# Patient Record
Sex: FEMALE | Race: BLACK OR AFRICAN AMERICAN
[De-identification: names, ages, dates, MRNs, and addresses within clinical notes are randomized per-mention and may not be internally consistent; named-entity substitution may affect disease eponyms.]

---

## 2024-03-25 ENCOUNTER — NON-APPOINTMENT (OUTPATIENT)
Age: 44
End: 2024-03-25

## 2024-03-25 PROBLEM — Z00.00 ENCOUNTER FOR PREVENTIVE HEALTH EXAMINATION: Status: ACTIVE | Noted: 2024-03-25

## 2024-03-27 ENCOUNTER — NON-APPOINTMENT (OUTPATIENT)
Age: 44
End: 2024-03-27

## 2024-03-29 ENCOUNTER — APPOINTMENT (OUTPATIENT)
Dept: BREAST CENTER | Facility: CLINIC | Age: 44
End: 2024-03-29
Payer: COMMERCIAL

## 2024-03-29 DIAGNOSIS — Z78.9 OTHER SPECIFIED HEALTH STATUS: ICD-10-CM

## 2024-03-29 DIAGNOSIS — Z86.79 PERSONAL HISTORY OF OTHER DISEASES OF THE CIRCULATORY SYSTEM: ICD-10-CM

## 2024-03-29 PROCEDURE — 99203 OFFICE O/P NEW LOW 30 MIN: CPT

## 2024-03-29 RX ORDER — ETONOGESTREL AND ETHINYL ESTRADIOL .12; .015 MG/D; MG/D
INSERT, EXTENDED RELEASE VAGINAL
Refills: 0 | Status: ACTIVE | COMMUNITY

## 2024-03-29 NOTE — ASSESSMENT
[FreeTextEntry1] : 43-year-old female, abnormal breast imaging  Reviewed the results with the patient of her bilateral screening mammogram and breast US completed on 3/13/2024, reviewed there were no abnormal mammographic findings, discussed the results of the nonspecific left axillary lymph node with mild cortical thickening of 0.4 cm, discussed with the patient the clinical breast exam findings which were unremarkable, no palpable masses and no adenopathy were appreciated on physical exam bilaterally.  Discussed with the patient that this finding may be reactive in nature, discussed the recommendation to proceed with a 6-month follow-up targeted left breast US.  Discussed the option given patient's concern the option to proceed with a 3-month follow-up targeted left breast US.  The patient would for to proceed with a targeted left breast US in 3 months and follow-up at that time.  Reviewed with the patient although the nonspecific finding in the left axilla is favored to be related to reactive changes discussed recommendation to meet with a hematologist should additional clinical evaluation be warranted given the nonspecific finding in the left axilla.  The patient will be referred to hematologist for further evaluation of this.  Discussed importance and implication of genetic testing in regards to their family history of reported possible to family members affected by ovarian cancer in patient's maternal aunt and maternal grandmother.  Recommended patient meet with a genetic counselor to discuss her family history and options to proceed with genetic testing.  The patient was amenable and will be referred.    Discussed breast awareness and self breast exams with the patient. Recommended simple pattern of palpation, while seated and while laying down. Recommended that she performs these breast exams at the same time every month, as to time it with her cycle. Discussed characteristics of a suspicious mass, such as irregular, hard like a rock and fixed/immobile. Patient understands.

## 2024-03-29 NOTE — HISTORY OF PRESENT ILLNESS
[FreeTextEntry1] : JANELLE is a 43 year old female, referred by Dr. Rodríguez (OBGYN), who presents for initial evaluation regarding abnormal breast imaging.  The patient underwent a bilateral screening mammogram and breast US on 3/13/2024 results showed a nonspecific left axillary lymph node with mild cortical thickening measuring 0.4 cm, recommended for clinical evaluation and 6-month follow-up targeted left breast US.  The patient denies any trauma to the left arm, denies trauma infection or injury to the left axillary region.  The patient denies recent vaccination.  Patient denies any history of autoimmune disorders.  Patient has no breast complaints. Denies palpable abnormalities of the breast, no skin changes/dimpling, no nipple discharge bilaterally.   Denies history of breast biopsy or breast surgery.   JACOB lifetime risk of 10.5%; the patient denies any family history of breast cancer.  Patient states that her maternal aunt was possibly diagnosed with ovarian cancer in her 70s and DOD.  The patient has a maternal grandmother diagnosed with stomach cancer versus ovarian cancer and DOD at age 79 or 80.

## 2024-03-29 NOTE — PAST MEDICAL HISTORY
[Menstruating] : The patient is menstruating [Irregular Cycle Intervals] : are  irregular [Multiple Births ___] :  multiple birth pregnancies: [unfilled] [Menarche Age ____] : age at menarche was [unfilled] [Definite ___ (Date)] : the last menstrual period was [unfilled] [Total Preg ___] : G[unfilled] [Live Births ___] : P[unfilled]  [Age At Live Birth ___] : Age at live birth: [unfilled] [History of Hormone Replacement Treatment] : has no history of hormone replacement treatment

## 2024-03-29 NOTE — PLAN
[TextEntry] : Targeted left axillary US in June 2024 Follow-up June 2024 Consult with hematology Consult with genetic counselor

## 2024-03-29 NOTE — PHYSICAL EXAM
[Normocephalic] : normocephalic [No Supraclavicular Adenopathy] : no supraclavicular adenopathy [Examined in the supine and seated position] : examined in the supine and seated position [No dominant masses] : no dominant masses in right breast  [No dominant masses] : no dominant masses left breast [No Nipple Retraction] : no left nipple retraction [No Nipple Discharge] : no right nipple discharge [No Axillary Lymphadenopathy] : no left axillary lymphadenopathy [No Edema] : no edema [No Swelling] : no swelling [No Rashes] : no rashes [No Ulceration] : no ulceration

## 2024-03-29 NOTE — FAMILY HISTORY
[TextEntry] : Maternal aunt, possible ovarian cancer, 70s, DOD Maternal grandmother, stomach cancer versus ovarian cancer, DOD 79 or 80 Denies family history of breast cancer, pancreatic cancer, colon cancer, melanoma

## 2024-03-29 NOTE — DATA REVIEWED
[FreeTextEntry1] : 3/13/24 (R) b/l screening mammogram & US: heterogeneously dense, b/l axillary region skin folds. left axillary lymph node with mild cortical thickening at 0.4cm, stability cannot be confirmed. Non specific left axillary lymph node cortical thickening on US, clinical correlation is recommended. if clinical evaluation shows no clinical or lab findings concerning for lymphatic pathology, benign etiologies then including reactive changes are favored and 6 month follow up targeted left breast US is recommended. Probably benign BIRADS 3

## 2024-04-10 ENCOUNTER — NON-APPOINTMENT (OUTPATIENT)
Age: 44
End: 2024-04-10

## 2024-06-19 ENCOUNTER — NON-APPOINTMENT (OUTPATIENT)
Age: 44
End: 2024-06-19

## 2024-06-21 ENCOUNTER — APPOINTMENT (OUTPATIENT)
Dept: BREAST CENTER | Facility: CLINIC | Age: 44
End: 2024-06-21
Payer: COMMERCIAL

## 2024-06-21 VITALS
DIASTOLIC BLOOD PRESSURE: 95 MMHG | HEIGHT: 62 IN | BODY MASS INDEX: 35.51 KG/M2 | HEART RATE: 62 BPM | SYSTOLIC BLOOD PRESSURE: 147 MMHG | WEIGHT: 193 LBS

## 2024-06-21 DIAGNOSIS — Z80.41 FAMILY HISTORY OF MALIGNANT NEOPLASM OF OVARY: ICD-10-CM

## 2024-06-21 DIAGNOSIS — R92.8 OTHER ABNORMAL AND INCONCLUSIVE FINDINGS ON DIAGNOSTIC IMAGING OF BREAST: ICD-10-CM

## 2024-06-21 PROCEDURE — 99213 OFFICE O/P EST LOW 20 MIN: CPT

## 2024-06-21 NOTE — ASSESSMENT
[FreeTextEntry1] : 43-year-old female, abnormal breast imaging  Reviewed the results with the patient of her targeted left axillary ultrasound completed on 6/14/2024 showing there is no axillary lymphadenopathy with the left axillary lymph node of concern cortical thickness is less when compared to 3/13/2024, results were benign BI-RADS 2.  Advised the patient she can return to her annual screening imaging with a bilateral screening mammogram and ultrasound that will be due in March 2025.  Reviewed with the patient she can return to her PCP or gynecologist moving forward for breast cancer screening and clinical breast exams and follow-up as needed.   Reviewed importance and implication of genetic testing in regards to their family history of reported possible to family members affected by ovarian cancer in patient's maternal aunt and maternal grandmother.  Reviewed recommendation for patient to meet with a genetic counselor to discuss her family history and options to proceed with genetic testing.  The patient states that the genetics team had not reached out to her for a consult, referral will be resent regarding this patient.    JAVIER Myers

## 2024-06-21 NOTE — DATA REVIEWED
[FreeTextEntry1] : 3/13/24 ([facility]) b/l screening mammogram & US: heterogeneously dense, b/l axillary region skin folds. left axillary lymph node with mild cortical thickening at 0.4cm, stability cannot be confirmed. Non specific left axillary lymph node cortical thickening on US, clinical correlation is recommended. if clinical evaluation shows no clinical or lab findings concerning for lymphatic pathology, benign etiologies then including reactive changes are favored and 6 month follow up targeted left breast US is recommended. Probably benign BIRADS 3  6/14/2024 ([facility]) left breast targeted US: No left axillary lymphadenopathy. Benign findings. BIRADS 2

## 2024-06-21 NOTE — PAST MEDICAL HISTORY
[Menstruating] : The patient is menstruating [Menarche Age ____] : age at menarche was [unfilled] [Definite ___ (Date)] : the last menstrual period was [unfilled] [Irregular Cycle Intervals] : are  irregular [Total Preg ___] : G[unfilled] [Live Births ___] : P[unfilled]  [Multiple Births ___] :  multiple birth pregnancies: [unfilled] [Age At Live Birth ___] : Age at live birth: [unfilled] [History of Hormone Replacement Treatment] : has no history of hormone replacement treatment

## 2024-06-21 NOTE — PHYSICAL EXAM
[Normocephalic] : normocephalic [No Supraclavicular Adenopathy] : no supraclavicular adenopathy [Examined in the supine and seated position] : examined in the supine and seated position [No dominant masses] : no dominant masses in right breast  [No dominant masses] : no dominant masses left breast [No Nipple Retraction] : no left nipple retraction [No Nipple Discharge] : no left nipple discharge [No Axillary Lymphadenopathy] : no left axillary lymphadenopathy [No Edema] : no edema [No Swelling] : no swelling [No Rashes] : no rashes [No Ulceration] : no ulceration

## 2024-06-21 NOTE — HISTORY OF PRESENT ILLNESS
[FreeTextEntry1] : JANELLE is a 43 year old female, for follow up of abnormal breast imaging.  The patient underwent a bilateral screening mammogram and breast US on 3/13/2024 results showed a nonspecific left axillary lymph node with mild cortical thickening measuring 0.4 cm, recommended for clinical evaluation and 6-month follow-up targeted left breast US.  The patient denies any trauma to the left arm, denies trauma infection or injury to the left axillary region.  The patient denies recent vaccination.  Patient denies any history of autoimmune disorders.  Patient has no breast complaints. Denies palpable abnormalities of the breast, no skin changes/dimpling, no nipple discharge bilaterally. The patient had opted to undergo a short interval 3-month follow-up targeted left axillary ultrasound, which she completed on 6/14/2024 and returned benign BI-RADS 2, no axillary lymphadenopathy was appreciated on Ultrasound.  Denies history of breast biopsy or breast surgery.   JACOB lifetime risk of 10.5%; the patient denies any family history of breast cancer.  Patient states that her maternal aunt was possibly diagnosed with ovarian cancer in her 70s and DOD.  The patient has a maternal grandmother diagnosed with stomach cancer versus ovarian cancer and DOD at age 79 or 80.

## 2024-07-31 ENCOUNTER — APPOINTMENT (OUTPATIENT)
Dept: HEMATOLOGY ONCOLOGY | Facility: CLINIC | Age: 44
End: 2024-07-31

## 2024-08-06 NOTE — DISCUSSION/SUMMARY
[FreeTextEntry1] : REASON FOR CONSULT: Svitlana Valentine is a 43-year-old female referred by nurse practitioner, Jazmine Harvey, for cancer genetic counseling and risk assessment due to a family history of cancer. Ms. Valentine was seen on 2024 at which time medical and family history was ascertained and a pedigree constructed.   RELEVANT MEDICAL HISTORY: Ms. Valentine is a healthy individual with no reported history of cancer. She has a family history of cancer, see below.  OTHER MEDICAL AND SURGICAL HISTORY: -	Medical History: HTN -	Surgical History: 2 hernia repairs, myomectomy, cholecystectomy,  x 2   OB/GYN HISTORY: H/W: 5'2", 200lb Obstetrical History:  Age at Menarche: 16 Menopausal Status: Premenopausal  Age at First Live Birth: 21 Oral Contraceptive Use: Yes, 20 years Hormone Replacement Therapy: No  CANCER SCREENING HISTORY:   Breast:  -	3/13/24 (R) b/l screening mammogram & US: heterogeneously dense, b/l axillary region skin folds. left axillary lymph node with mild cortical thickening at 0.4cm, stability cannot be confirmed. Non specific left axillary lymph node cortical thickening on US, clinical correlation is recommended. if clinical evaluation shows no clinical or lab findings concerning for lymphatic pathology, benign etiologies then including reactive changes are favored and 6 month follow up targeted left breast US is recommended. Probably benign BIRADS 3 -	2024 (R) left breast targeted US: No left axillary lymphadenopathy. Benign findings. BIRADS 2. -	Biopsies: No GYN: -	Pelvic Examination: Annual- reportedly wnl, history of fibroids Colon: -	Colonoscopy: 1-2 years ago- colitis Skin:   -	FBSE: No -	Lesions biopsied/removed: No  SOCIAL HISTORY: -	Tobacco-product use: No -	Environmental exposures: No   FAMILY HISTORY: Maternal ancestry was reported as [place] and paternal ancestry was reported as [place]. Ashkenazi Congregation ancestry was denied. A detailed family history of cancer was ascertained, see below and scanned chart for pedigree.   According to Ms. Valentine no one in the family has had germline testing for cancer susceptibility. Consanguinity was denied.  	 RISK ASSESSMENT: Ms. Valentine's personal and family history is slightly suggestive of a hereditary cancer syndrome however she is unsure if her maternal aunt and maternal grandmother had either stomach or ovarian cancer. Although risk assessment is difficult in the setting of unknown primaries, we discussed genetic testing may be beneficial to determine appropriate management. Patient is aware of potential out of pocket cost if insurance denies testing. We therefore recommended genetic testing for genes associated with breast, gynecological, and gastric cancers through [facility]. This test analyzes 30 genes: APC, ABDULKADIR, BARD1, BMPR1A, BRCA1, BRCA2, BRIP1, CDH1, CHEK2, CTNNA1, EPCAM, FH, KIT, MLH1, MSH2, MSH6, NF1, PALB2, PDGFRA, PMS2, PTEN, RAD51C, RAD51D, SDHA, SDHB, SDHC, SDHD, SMAD4, STK11, and TP53.  The risks, benefits and limitations of genetic testing were discussed with Ms. Valentine. In addition, we discussed the purpose of genetic testing and possible test results (positive, negative, inconclusive) along with associated medical management options and psychosocial implications. Insurance coverage and potential out of pocket costs were also discussed. The Genetic Information Non-discrimination Act (JESUS) was reviewed.  It was explained that risk assessment is based upon medical and family history as provided and may change in the future should new information be obtained.   Following our discussion, Ms. Valentine consented to the above-mentioned genetic testing panel. Blood was drawn in our laboratory and sent to [facility] today.  PLAN:  1.	Blood drawn today will be sent to [facility] for analysis.  2.	We will contact Ms. Valentine to schedule a follow-up appointment once the results are available. Results generally return in 2-3 weeks.   For any additional questions please call Cancer Genetics at [phone].    Rola Pelletier MS, [facility] Genetic Counselor, Cancer Genetics

## 2024-08-27 ENCOUNTER — NON-APPOINTMENT (OUTPATIENT)
Age: 44
End: 2024-08-27

## 2024-08-27 NOTE — DISCUSSION/SUMMARY
[FreeTextEntry1] : RESULTS TRANSMISSION:   Svitlana Valentine is a 43-year-old female who was contacted August 27, 2024 for a discussion regarding her variant of uncertain significance (VUS) genetic testing results related to hereditary cancer predisposition. This session was conducted via telephone.    Ms. Valentine was originally seen at [facility] on July 31, 2024 for hereditary cancer predisposition risk assessment due to a family history of cancer. At that time, Ms. Valentine decided to pursue genetic testing for genes associated with breast, gynecological, and gastric cancers through [facility].  TEST RESULTS: VARIANT OF UNCERTAIN SIGNIFICANCE (VUS)- APC (c.[id number]; [id number])  NO pathogenic (disease-causing) variants or additional VUSs were detected in the following genes [30]:  APC, ABDULKADIR, BARD1, BMPR1A, BRCA1, BRCA2, BRIP1, CDH1, CHEK2, CTNNA1, EPCAM, FH, KIT, MLH1, MSH2, MSH6, NF1, PALB2, PDGFRA, PMS2, PTEN, RAD51C, RAD51D, SDHA, SDHB, SDHC, SDHD, SMAD4, STK11, and TP53.  RESULTS INTERPRETATION AND ASSESSMENT: At this time the available evidence is insufficient to determine the role of this VUS in disease and the clinical significance of this result is uncertain. Pathogenic mutations in the APC gene are associated with Familial Adenomatous Polyposis syndrome, Attenuated Familial Adenomatous Polyposis syndrome, and Gastric adenocarcinoma and proximal polyposis of the stomach. It is unknown if the patient has an increased risk for the cancers associated with the APC gene at this time but we did discuss it is reassuring the patient had a colonoscopy and reported no history of polyps.   The detection of this VUS does NOT currently change the patient's medical management. It is NOT recommended at this time that family members use this result for predictive genetic testing or medical management decisions. With more research, a VUS may be reclassified as either disease-causing or benign. Ms. Valentine was encouraged to contact us every 2-3 years to enquire about any new information for this variant, or sooner if there are any changes in her personal or family history of cancer.  Such updates could possibly change our risk assessment and recommendations.  We also discussed that, while no clear cause of the patient's personal and family history of cancer was identified, this result, while reassuring, does entirely not rule out a hereditary cancer risk in the patient. It is possible, although unlikely, the patient has a mutation in one of the genes tested that is not detectable by this analysis, or has a mutation in a different gene, either known or unknown. It is also possible there is a hereditary cancer predisposition in the family, but the patient did not inherit it.   Given Ms. Valentine's personal and current reported family history of cancer, and her negative genetic test results, the following screening guidelines and risk-reducing recommendations were discussed:  OTHER: -	In the absence of other indications, Ms. Valentine should practice age-appropriate cancer screening of other organ systems as recommended for the general population.  PLAN: 1.	These results do not change Ms. Valentine's medical management.  2.	Testing of family members for this VUS is not recommended.  3.	Patient informed consult note(s) will be available through their [employer] patient portal and genetic test results will be released via [facility]s laboratory portal.  4.	The patient was encouraged to contact us every 2-3 years to check on any changes in interpretation of the VUS, or sooner if there are changes in her personal or family history of cancer.   For any additional questions please call Cancer Genetics at [phone].    Rola Pelletier MS, [facility] Counselor, [facility]

## 2024-08-30 ENCOUNTER — APPOINTMENT (OUTPATIENT)
Dept: NEPHROLOGY | Facility: CLINIC | Age: 44
End: 2024-08-30
Payer: COMMERCIAL

## 2024-08-30 VITALS — HEART RATE: 66 BPM | SYSTOLIC BLOOD PRESSURE: 148 MMHG | DIASTOLIC BLOOD PRESSURE: 97 MMHG

## 2024-08-30 VITALS — BODY MASS INDEX: 36.95 KG/M2 | WEIGHT: 202 LBS

## 2024-08-30 DIAGNOSIS — R00.2 PALPITATIONS: ICD-10-CM

## 2024-08-30 DIAGNOSIS — Z79.899 OTHER LONG TERM (CURRENT) DRUG THERAPY: ICD-10-CM

## 2024-08-30 DIAGNOSIS — E88.810 METABOLIC SYNDROME: ICD-10-CM

## 2024-08-30 DIAGNOSIS — I10 ESSENTIAL (PRIMARY) HYPERTENSION: ICD-10-CM

## 2024-08-30 DIAGNOSIS — H81.10 BENIGN PAROXYSMAL VERTIGO, UNSPECIFIED EAR: ICD-10-CM

## 2024-08-30 DIAGNOSIS — R79.9 ABNORMAL FINDING OF BLOOD CHEMISTRY, UNSPECIFIED: ICD-10-CM

## 2024-08-30 DIAGNOSIS — E66.9 OBESITY, UNSPECIFIED: ICD-10-CM

## 2024-08-30 DIAGNOSIS — R68.89 OTHER GENERAL SYMPTOMS AND SIGNS: ICD-10-CM

## 2024-08-30 PROCEDURE — 36415 COLL VENOUS BLD VENIPUNCTURE: CPT

## 2024-08-30 PROCEDURE — 99204 OFFICE O/P NEW MOD 45 MIN: CPT | Mod: 25

## 2024-08-30 RX ORDER — CHLORTHALIDONE 25 MG/1
25 TABLET ORAL
Qty: 45 | Refills: 3 | Status: ACTIVE | COMMUNITY
Start: 2024-08-30 | End: 1900-01-01

## 2024-08-30 NOTE — PHYSICAL EXAM
[General Appearance - Alert] : alert [General Appearance - In No Acute Distress] : in no acute distress [Neck Appearance] : the appearance of the neck was normal [Neck Cervical Mass (___cm)] : no neck mass was observed [Jugular Venous Distention Increased] : there was no jugular-venous distention [Thyroid Nodule] : there were no palpable thyroid nodules [Thyroid Diffuse Enlargement] : the thyroid was not enlarged [Auscultation Breath Sounds / Voice Sounds] : lungs were clear to auscultation bilaterally [Heart Rate And Rhythm] : heart rate was normal and rhythm regular [Heart Sounds] : normal S1 and S2 [Heart Sounds Gallop] : no gallops [Murmurs] : no murmurs [Heart Sounds Pericardial Friction Rub] : no pericardial rub [Edema] : there was no peripheral edema [Abdomen Soft] : soft [Abdomen Tenderness] : non-tender [] : no hepato-splenomegaly [Abdomen Mass (___ Cm)] : no abdominal mass palpated [Cervical Lymph Nodes Enlarged Posterior Bilaterally] : posterior cervical [Cervical Lymph Nodes Enlarged Anterior Bilaterally] : anterior cervical [Supraclavicular Lymph Nodes Enlarged Bilaterally] : supraclavicular [FreeTextEntry1] : pitting ankle edema

## 2024-08-30 NOTE — ASSESSMENT
[FreeTextEntry1] : # Uncontrolled HTN. * Recheck labs, including renin/yifan. * Sleep apnea eval. * Change HCTZ 12.5 mg to chlorthalidone 12.5 mg daily.  * The patient's blood pressure was checked with the [employer] [id number]-[id number] using the SPRINT trial protocol after sitting quietly in an empty room with arm supported, back supported, and feet on the floor for 5 minutes. The average of 3 readings were taken. * A counseling information sheet on blood pressure and staying healthy has been given (which they have been instructed to read). * The patient has been counseled to check their BP at home with an automatic arm cuff, write down the readings, and reach me directly on the phone immediately if they are persistently > 180 systolic or if SBP is less than 100 or if lightheadedness develops. They were counseled to bring in all blood pressure readings and medications next visit. * The patient has been counseled that regular office follow-up (at least every 1 mo for now)  is important for monitoring and for their health, and that it is their responsibility to make follow up appointments. * The patient also has been counseled that they must never stop or change any medications without discussing this with me (or another physician).   # Obesity. * Weight loss. * Endo referrral.  # History of palpitations. * Cardiology referral.
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
## 2024-09-03 LAB
ALBUMIN SERPL ELPH-MCNC: 3.9 G/DL
ALDOSTERONE SERUM: 26.6 NG/DL
ALP BLD-CCNC: 71 U/L
ALT SERPL-CCNC: 7 U/L
ANION GAP SERPL CALC-SCNC: 12 MMOL/L
APPEARANCE: CLEAR
AST SERPL-CCNC: 17 U/L
BASOPHILS # BLD AUTO: 0.05 K/UL
BASOPHILS NFR BLD AUTO: 0.9 %
BILIRUB SERPL-MCNC: 0.5 MG/DL
BILIRUBIN URINE: NEGATIVE
BLOOD URINE: NEGATIVE
BUN SERPL-MCNC: 18 MG/DL
CALCIUM SERPL-MCNC: 9.1 MG/DL
CALCIUM SERPL-MCNC: 9.1 MG/DL
CHLORIDE SERPL-SCNC: 107 MMOL/L
CO2 SERPL-SCNC: 22 MMOL/L
COLOR: YELLOW
CREAT SERPL-MCNC: 0.85 MG/DL
CREAT SPEC-SCNC: 135 MG/DL
EGFR: 88 ML/MIN/1.73M2
EOSINOPHIL # BLD AUTO: 0.07 K/UL
EOSINOPHIL NFR BLD AUTO: 1.2 %
ESTIMATED AVERAGE GLUCOSE: 108 MG/DL
FERRITIN SERPL-MCNC: 35 NG/ML
GLUCOSE QUALITATIVE U: NEGATIVE MG/DL
GLUCOSE SERPL-MCNC: 87 MG/DL
HBA1C MFR BLD HPLC: 5.4 %
HCT VFR BLD CALC: 39 %
HGB BLD-MCNC: 12.7 G/DL
IMM GRANULOCYTES NFR BLD AUTO: 0.2 %
KETONES URINE: NEGATIVE MG/DL
LEUKOCYTE ESTERASE URINE: NEGATIVE
LYMPHOCYTES # BLD AUTO: 1.27 K/UL
LYMPHOCYTES NFR BLD AUTO: 21.6 %
MAGNESIUM SERPL-MCNC: 1.9 MG/DL
MAN DIFF?: NORMAL
MCHC RBC-ENTMCNC: 29.6 PG
MCHC RBC-ENTMCNC: 32.6 GM/DL
MCV RBC AUTO: 90.9 FL
MICROALBUMIN 24H UR DL<=1MG/L-MCNC: <1.2 MG/DL
MICROALBUMIN/CREAT 24H UR-RTO: NORMAL MG/G
MONOCYTES # BLD AUTO: 0.43 K/UL
MONOCYTES NFR BLD AUTO: 7.3 %
NEUTROPHILS # BLD AUTO: 4.05 K/UL
NEUTROPHILS NFR BLD AUTO: 68.8 %
NITRITE URINE: NEGATIVE
PARATHYROID HORMONE INTACT: 44 PG/ML
PH URINE: 6
PLATELET # BLD AUTO: 236 K/UL
POTASSIUM SERPL-SCNC: 4.4 MMOL/L
PROT SERPL-MCNC: 6.7 G/DL
PROTEIN URINE: NEGATIVE MG/DL
RBC # BLD: 4.29 M/UL
RBC # FLD: 12.8 %
SODIUM SERPL-SCNC: 141 MMOL/L
SPECIFIC GRAVITY URINE: 1.02
UROBILINOGEN URINE: 0.2 MG/DL
WBC # FLD AUTO: 5.88 K/UL

## 2024-09-08 LAB
METANEPHRINE, PL: 35 PG/ML
NORMETANEPHRINE, PL: 75.4 PG/ML

## 2024-09-19 ENCOUNTER — NON-APPOINTMENT (OUTPATIENT)
Age: 44
End: 2024-09-19

## 2024-09-19 ENCOUNTER — APPOINTMENT (OUTPATIENT)
Dept: HEART AND VASCULAR | Facility: CLINIC | Age: 44
End: 2024-09-19
Payer: COMMERCIAL

## 2024-09-19 VITALS
HEART RATE: 66 BPM | WEIGHT: 200 LBS | DIASTOLIC BLOOD PRESSURE: 85 MMHG | OXYGEN SATURATION: 99 % | BODY MASS INDEX: 36.8 KG/M2 | HEIGHT: 62 IN | TEMPERATURE: 97.7 F | SYSTOLIC BLOOD PRESSURE: 126 MMHG

## 2024-09-19 DIAGNOSIS — R29.818 OTHER SYMPTOMS AND SIGNS INVOLVING THE NERVOUS SYSTEM: ICD-10-CM

## 2024-09-19 DIAGNOSIS — I10 ESSENTIAL (PRIMARY) HYPERTENSION: ICD-10-CM

## 2024-09-19 DIAGNOSIS — R00.2 PALPITATIONS: ICD-10-CM

## 2024-09-19 DIAGNOSIS — E66.9 OBESITY, UNSPECIFIED: ICD-10-CM

## 2024-09-19 PROCEDURE — 99203 OFFICE O/P NEW LOW 30 MIN: CPT | Mod: 25

## 2024-09-19 PROCEDURE — G2211 COMPLEX E/M VISIT ADD ON: CPT | Mod: NC

## 2024-09-19 PROCEDURE — 93000 ELECTROCARDIOGRAM COMPLETE: CPT

## 2024-09-19 RX ORDER — METOPROLOL SUCCINATE 100 MG/1
100 TABLET, EXTENDED RELEASE ORAL DAILY
Refills: 0 | Status: ACTIVE | COMMUNITY

## 2024-09-19 RX ORDER — GABAPENTIN 300 MG/1
300 CAPSULE ORAL
Refills: 0 | Status: ACTIVE | COMMUNITY

## 2024-09-19 NOTE — ASSESSMENT
[FreeTextEntry1] : EKG NSR NSST  A/ { 1. HTN BP > goal Possibility of superimposed white coat on top of HTN renin yifan and catechol w/u neg ass per Renal  For now ABPM to assess usual BP Echo to assess LVH No change to meds today, pending ZEINA evaluation as eblow   Suspect ZEINA In view of aboce symptoms and associated end organ considtion,s HST is indicatde to r/ ZEINA  Palpitations  No syncope or lightheadeness EKG as note Pending ZEINA w/u, defer zio for now

## 2024-09-19 NOTE — HISTORY OF PRESENT ILLNESS
[FreeTextEntry1] : 43 F HTN  + HTN (longstanding), - DM, - lipids,- tobacco, - family hx On BP meds, recent BP > goal, mostly in MD office: when she previously measured BP at home, was often at goal  Denies CP, SOB, orthopnea, PND, palpitations or edema  ZEINA screen + snoring + day time somnolence  + non restorative sleep  - witnessed apnea

## 2024-09-24 DIAGNOSIS — R29.818 OTHER SYMPTOMS AND SIGNS INVOLVING THE NERVOUS SYSTEM: ICD-10-CM

## 2024-10-08 ENCOUNTER — APPOINTMENT (OUTPATIENT)
Dept: SLEEP CENTER | Facility: HOME HEALTH | Age: 44
End: 2024-10-08

## 2024-10-15 ENCOUNTER — APPOINTMENT (OUTPATIENT)
Dept: HEART AND VASCULAR | Facility: CLINIC | Age: 44
End: 2024-10-15

## 2024-10-21 ENCOUNTER — APPOINTMENT (OUTPATIENT)
Dept: HEART AND VASCULAR | Facility: CLINIC | Age: 44
End: 2024-10-21
Payer: COMMERCIAL

## 2024-10-21 VITALS
HEIGHT: 62 IN | WEIGHT: 200 LBS | OXYGEN SATURATION: 97 % | HEART RATE: 71 BPM | BODY MASS INDEX: 36.8 KG/M2 | DIASTOLIC BLOOD PRESSURE: 94 MMHG | TEMPERATURE: 98.3 F | SYSTOLIC BLOOD PRESSURE: 134 MMHG

## 2024-10-21 DIAGNOSIS — R00.2 PALPITATIONS: ICD-10-CM

## 2024-10-21 DIAGNOSIS — I10 ESSENTIAL (PRIMARY) HYPERTENSION: ICD-10-CM

## 2024-10-21 DIAGNOSIS — E66.811 OBESITY, CLASS 1: ICD-10-CM

## 2024-10-21 DIAGNOSIS — R29.818 OTHER SYMPTOMS AND SIGNS INVOLVING THE NERVOUS SYSTEM: ICD-10-CM

## 2024-10-21 PROCEDURE — 95800 SLP STDY UNATTENDED: CPT | Mod: TC

## 2024-10-21 PROCEDURE — G2211 COMPLEX E/M VISIT ADD ON: CPT | Mod: NC

## 2024-10-21 PROCEDURE — ZZZZZ: CPT

## 2024-10-21 PROCEDURE — 99214 OFFICE O/P EST MOD 30 MIN: CPT

## 2024-10-22 ENCOUNTER — APPOINTMENT (OUTPATIENT)
Dept: NEPHROLOGY | Facility: CLINIC | Age: 44
End: 2024-10-22

## 2024-10-22 NOTE — ASSESSMENT
[FreeTextEntry1] : ABPM 127/85 A/  1. HTN, white coat on top of underlying HTN BP > goal renin henry and catechol w/u neg as per Renal   ABPM to assess usual BP - 127/85 Echo to assess LVH No change to meds today, pending TRISTAN evaluation as below   2. Suspect TRISTAN In view of above symptoms and associated end organ conditions, HST is indicated to r/ TRISTAN  3. Palpitations  No syncope or lightheadeness EKG as noted Pending TRISTAN w/u, defer zio for now

## 2024-10-22 NOTE — HISTORY OF PRESENT ILLNESS
[FreeTextEntry1] : 43 F HTN  + HTN (longstanding), - DM, - lipids,- tobacco, - family hx On BP meds, recent BP > goal, mostly in MD office: when she previously measured BP at home, was often at goal Denies CP, SOB, orthopnea, PND, palpitations or edema  TRISTAN screen + snoring + day time somnolence + non restorative sleep - witnessed apnea

## 2024-11-08 ENCOUNTER — APPOINTMENT (OUTPATIENT)
Dept: HEART AND VASCULAR | Facility: CLINIC | Age: 44
End: 2024-11-08
Payer: COMMERCIAL

## 2024-11-08 PROCEDURE — 95800 SLP STDY UNATTENDED: CPT | Mod: TC

## 2024-11-25 ENCOUNTER — APPOINTMENT (OUTPATIENT)
Dept: HEART AND VASCULAR | Facility: CLINIC | Age: 44
End: 2024-11-25
Payer: COMMERCIAL

## 2024-11-25 VITALS
DIASTOLIC BLOOD PRESSURE: 100 MMHG | SYSTOLIC BLOOD PRESSURE: 180 MMHG | OXYGEN SATURATION: 99 % | WEIGHT: 203 LBS | TEMPERATURE: 98 F | BODY MASS INDEX: 37.36 KG/M2 | HEART RATE: 82 BPM | HEIGHT: 62 IN

## 2024-11-25 VITALS — DIASTOLIC BLOOD PRESSURE: 85 MMHG | SYSTOLIC BLOOD PRESSURE: 125 MMHG

## 2024-11-25 DIAGNOSIS — R00.2 PALPITATIONS: ICD-10-CM

## 2024-11-25 DIAGNOSIS — I10 ESSENTIAL (PRIMARY) HYPERTENSION: ICD-10-CM

## 2024-11-25 DIAGNOSIS — R29.818 OTHER SYMPTOMS AND SIGNS INVOLVING THE NERVOUS SYSTEM: ICD-10-CM

## 2024-11-25 PROCEDURE — G2211 COMPLEX E/M VISIT ADD ON: CPT | Mod: NC

## 2024-11-25 PROCEDURE — 99214 OFFICE O/P EST MOD 30 MIN: CPT

## 2024-11-25 NOTE — HISTORY OF PRESENT ILLNESS
[FreeTextEntry1] : 43 F f/u HTN  + HTN (longstanding), - DM, - lipids,- tobacco, - family hx On BP meds, recent BP > goal, mostly in MD office: when she previously measured BP at home, was often at goal Denies CP, SOB, orthopnea, PND, palpitations or edema  TRISTAN screen + snoring + day time somnolence + non restorative sleep - witnessed apnea

## 2024-11-25 NOTE — ASSESSMENT
[FreeTextEntry1] : ABPM 127/85 A/P 1. HTN, white coat on top of underlying HTN 2. elevated BP readings renin henry and catechol w/u neg as per Renal  ABPM to assess usual BP - 127/85 Echo to assess LVH - nl  No change to meds today, pending TRISTAN evaluation as below  2. Suspect TRISTAN In view of above symptoms and associated end organ conditions, HST r/o TRISTAN - insufficeint sleep  need to repeat study   3. Palpitations  No syncope or lightheadedness EKG as noted Pending TRISTAN w/u, defer zio for now

## 2024-12-10 ENCOUNTER — NON-APPOINTMENT (OUTPATIENT)
Age: 44
End: 2024-12-10

## 2024-12-10 ENCOUNTER — APPOINTMENT (OUTPATIENT)
Dept: HEART AND VASCULAR | Facility: CLINIC | Age: 44
End: 2024-12-10
Payer: COMMERCIAL

## 2024-12-10 VITALS
SYSTOLIC BLOOD PRESSURE: 150 MMHG | TEMPERATURE: 98 F | DIASTOLIC BLOOD PRESSURE: 100 MMHG | WEIGHT: 203 LBS | HEART RATE: 76 BPM | OXYGEN SATURATION: 99 % | HEIGHT: 62 IN | BODY MASS INDEX: 37.36 KG/M2

## 2024-12-10 VITALS — DIASTOLIC BLOOD PRESSURE: 80 MMHG | SYSTOLIC BLOOD PRESSURE: 135 MMHG

## 2024-12-10 DIAGNOSIS — R29.818 OTHER SYMPTOMS AND SIGNS INVOLVING THE NERVOUS SYSTEM: ICD-10-CM

## 2024-12-10 DIAGNOSIS — R00.2 PALPITATIONS: ICD-10-CM

## 2024-12-10 DIAGNOSIS — I10 ESSENTIAL (PRIMARY) HYPERTENSION: ICD-10-CM

## 2024-12-10 PROCEDURE — 93000 ELECTROCARDIOGRAM COMPLETE: CPT

## 2024-12-10 PROCEDURE — G2211 COMPLEX E/M VISIT ADD ON: CPT | Mod: NC

## 2024-12-10 PROCEDURE — 93242 EXT ECG>48HR<7D RECORDING: CPT

## 2024-12-10 PROCEDURE — 99213 OFFICE O/P EST LOW 20 MIN: CPT

## 2024-12-10 NOTE — HISTORY OF PRESENT ILLNESS
[FreeTextEntry1] : 43 F f/u HTN  + HTN (longstanding), - DM, - lipids,- tobacco, - family hx On BP meds, recent BP > goal, mostly in [facility] office: when she previously measured BP at home, was often at goal Denies CP, SOB, orthopnea, PND,or edema  Still notes recurring intermittent, fast and forceful, last 5 min, resolve spontaneously, more pronouced episode last weel on tri tpo SF. No syncope.   TRISTAN screen + snoring + day time somnolence + non restorative sleep - witnessed apnea

## 2024-12-10 NOTE — ASSESSMENT
[FreeTextEntry1] : A/P 1. HTN, white coat on top of underlying HTN 2. elevated BP readings renin henry and catechol w/u neg as per Renal ABPM to assess usual BP - 127/85 Echo to assess LVH - nl No change to meds today, pending TRISTAN evaluation as below BP again > goal (did not take meds toda) No changes pending TRISTAN   2. Suspect TRISTAN In view of above symptoms and associated end organ conditions, HST r/o TRISTAN - insufficeint sleep  repeat study - also unsuccessful will clarify options after two failed HST   3. Palpitations  No syncope or lightheadedness EKG as noted  zio for now TRISTAN w/u pending

## 2025-01-10 ENCOUNTER — APPOINTMENT (OUTPATIENT)
Dept: SLEEP CENTER | Facility: HOSPITAL | Age: 45
End: 2025-01-10

## 2025-01-10 ENCOUNTER — OUTPATIENT (OUTPATIENT)
Dept: OUTPATIENT SERVICES | Facility: HOSPITAL | Age: 45
LOS: 1 days | End: 2025-01-10
Payer: COMMERCIAL

## 2025-01-10 DIAGNOSIS — G47.33 OBSTRUCTIVE SLEEP APNEA (ADULT) (PEDIATRIC): ICD-10-CM

## 2025-01-10 PROCEDURE — 95810 POLYSOM 6/> YRS 4/> PARAM: CPT | Mod: 26

## 2025-01-10 PROCEDURE — 95810 POLYSOM 6/> YRS 4/> PARAM: CPT

## 2025-02-19 ENCOUNTER — APPOINTMENT (OUTPATIENT)
Dept: HEART AND VASCULAR | Facility: CLINIC | Age: 45
End: 2025-02-19

## 2025-02-20 ENCOUNTER — APPOINTMENT (OUTPATIENT)
Dept: HEART AND VASCULAR | Facility: CLINIC | Age: 45
End: 2025-02-20
Payer: COMMERCIAL

## 2025-02-20 DIAGNOSIS — R00.2 PALPITATIONS: ICD-10-CM

## 2025-02-20 DIAGNOSIS — I10 ESSENTIAL (PRIMARY) HYPERTENSION: ICD-10-CM

## 2025-02-20 PROCEDURE — G2211 COMPLEX E/M VISIT ADD ON: CPT | Mod: 95

## 2025-02-20 PROCEDURE — 99214 OFFICE O/P EST MOD 30 MIN: CPT | Mod: 95

## 2025-05-20 ENCOUNTER — APPOINTMENT (OUTPATIENT)
Dept: HEART AND VASCULAR | Facility: CLINIC | Age: 45
End: 2025-05-20